# Patient Record
Sex: MALE | Race: OTHER | NOT HISPANIC OR LATINO
[De-identification: names, ages, dates, MRNs, and addresses within clinical notes are randomized per-mention and may not be internally consistent; named-entity substitution may affect disease eponyms.]

---

## 2020-08-22 ENCOUNTER — TRANSCRIPTION ENCOUNTER (OUTPATIENT)
Age: 75
End: 2020-08-22

## 2021-06-30 ENCOUNTER — TRANSCRIPTION ENCOUNTER (OUTPATIENT)
Age: 76
End: 2021-06-30

## 2021-10-11 ENCOUNTER — TRANSCRIPTION ENCOUNTER (OUTPATIENT)
Age: 76
End: 2021-10-11

## 2022-01-02 ENCOUNTER — TRANSCRIPTION ENCOUNTER (OUTPATIENT)
Age: 77
End: 2022-01-02

## 2022-02-27 ENCOUNTER — TRANSCRIPTION ENCOUNTER (OUTPATIENT)
Age: 77
End: 2022-02-27

## 2022-04-21 ENCOUNTER — TRANSCRIPTION ENCOUNTER (OUTPATIENT)
Age: 77
End: 2022-04-21

## 2022-11-29 ENCOUNTER — NON-APPOINTMENT (OUTPATIENT)
Age: 77
End: 2022-11-29

## 2023-10-05 ENCOUNTER — NON-APPOINTMENT (OUTPATIENT)
Age: 78
End: 2023-10-05

## 2023-12-16 ENCOUNTER — NON-APPOINTMENT (OUTPATIENT)
Age: 78
End: 2023-12-16

## 2024-03-08 PROBLEM — Z00.00 ENCOUNTER FOR PREVENTIVE HEALTH EXAMINATION: Status: ACTIVE | Noted: 2024-03-08

## 2024-04-01 ENCOUNTER — APPOINTMENT (OUTPATIENT)
Dept: CARDIOLOGY | Facility: CLINIC | Age: 79
End: 2024-04-01
Payer: MEDICARE

## 2024-04-01 ENCOUNTER — NON-APPOINTMENT (OUTPATIENT)
Age: 79
End: 2024-04-01

## 2024-04-01 VITALS
OXYGEN SATURATION: 98 % | WEIGHT: 199 LBS | SYSTOLIC BLOOD PRESSURE: 134 MMHG | DIASTOLIC BLOOD PRESSURE: 70 MMHG | HEIGHT: 71 IN | HEART RATE: 80 BPM | BODY MASS INDEX: 27.86 KG/M2

## 2024-04-01 DIAGNOSIS — Z82.49 FAMILY HISTORY OF ISCHEMIC HEART DISEASE AND OTHER DISEASES OF THE CIRCULATORY SYSTEM: ICD-10-CM

## 2024-04-01 DIAGNOSIS — K64.9 UNSPECIFIED HEMORRHOIDS: ICD-10-CM

## 2024-04-01 DIAGNOSIS — Z86.718 PERSONAL HISTORY OF OTHER VENOUS THROMBOSIS AND EMBOLISM: ICD-10-CM

## 2024-04-01 DIAGNOSIS — N40.1 BENIGN PROSTATIC HYPERPLASIA WITH LOWER URINARY TRACT SYMPMS: ICD-10-CM

## 2024-04-01 DIAGNOSIS — I45.10 UNSPECIFIED RIGHT BUNDLE-BRANCH BLOCK: ICD-10-CM

## 2024-04-01 DIAGNOSIS — R01.1 CARDIAC MURMUR, UNSPECIFIED: ICD-10-CM

## 2024-04-01 DIAGNOSIS — M54.9 DORSALGIA, UNSPECIFIED: ICD-10-CM

## 2024-04-01 DIAGNOSIS — Z79.01 LONG TERM (CURRENT) USE OF ANTICOAGULANTS: ICD-10-CM

## 2024-04-01 DIAGNOSIS — Z85.51 PERSONAL HISTORY OF MALIGNANT NEOPLASM OF BLADDER: ICD-10-CM

## 2024-04-01 DIAGNOSIS — R94.31 ABNORMAL ELECTROCARDIOGRAM [ECG] [EKG]: ICD-10-CM

## 2024-04-01 DIAGNOSIS — E78.5 HYPERLIPIDEMIA, UNSPECIFIED: ICD-10-CM

## 2024-04-01 DIAGNOSIS — Z78.9 OTHER SPECIFIED HEALTH STATUS: ICD-10-CM

## 2024-04-01 DIAGNOSIS — K21.9 GASTRO-ESOPHAGEAL REFLUX DISEASE W/OUT ESOPHAGITIS: ICD-10-CM

## 2024-04-01 DIAGNOSIS — K22.4 DYSKINESIA OF ESOPHAGUS: ICD-10-CM

## 2024-04-01 PROCEDURE — G2211 COMPLEX E/M VISIT ADD ON: CPT

## 2024-04-01 PROCEDURE — 93000 ELECTROCARDIOGRAM COMPLETE: CPT

## 2024-04-01 PROCEDURE — 99204 OFFICE O/P NEW MOD 45 MIN: CPT

## 2024-04-01 RX ORDER — OMEPRAZOLE 40 MG/1
40 CAPSULE, DELAYED RELEASE ORAL
Refills: 0 | Status: ACTIVE | COMMUNITY
Start: 2024-04-01

## 2024-04-01 RX ORDER — APIXABAN 5 MG/1
5 TABLET, FILM COATED ORAL
Qty: 90 | Refills: 2 | Status: ACTIVE | COMMUNITY
Start: 2024-04-01

## 2024-04-01 RX ORDER — FINASTERIDE 5 MG/1
5 TABLET, FILM COATED ORAL DAILY
Refills: 0 | Status: ACTIVE | COMMUNITY
Start: 2024-04-01

## 2024-04-01 RX ORDER — TAMSULOSIN HYDROCHLORIDE 0.4 MG/1
0.4 CAPSULE ORAL
Refills: 0 | Status: ACTIVE | COMMUNITY
Start: 2024-04-01

## 2024-04-01 RX ORDER — ATORVASTATIN CALCIUM 20 MG/1
20 TABLET, FILM COATED ORAL
Qty: 90 | Refills: 2 | Status: ACTIVE | COMMUNITY
Start: 2024-04-01

## 2024-04-01 NOTE — ASSESSMENT
[FreeTextEntry1] : Reviewed on April 1, 2024. EKG as noted above Most recent labs available reviewed 2020

## 2024-04-01 NOTE — REVIEW OF SYSTEMS
[Joint Pain] : joint pain [Joint Stiffness] : joint stiffness [Dizziness] : no dizziness [Negative] : Heme/Lymph [FreeTextEntry5] : As per HPI

## 2024-04-01 NOTE — DISCUSSION/SUMMARY
[EKG obtained to assist in diagnosis and management of assessed problem(s)] : EKG obtained to assist in diagnosis and management of assessed problem(s) [FreeTextEntry1] : 79 years old M seen today for consultation #1 RBBB abnormal stt changes: fatigue. echo cardiac MPI if needed lexiscan on meds .  Risk benefits alternatives side effects were reviewed.  Understands importance of ruling out any significant high risk obstructive CAD.  Other options would include coronary CTA or left heart catheterization if abnormalities found to be significant. Technetium 99 radioisotope use was reviewed.  Risks, benefits, alternatives of use of radioisotope was discussed at length.  Patient verbalized understanding and agreed with the management plan. 2.  Cardiac murmur.  Right bundle branch block.  Split second heart sound.  Evaluate LV RV and atrial chamber dimensions.  Rule out ASD.  And mitral regurgitation. 3.  Dyslipidemia.  On statin therapy.  Carotid Doppler study with evidence of ASCVD.  Aim LDL cholesterol to less than 70.  Lifestyle modifications reviewed. 4.  Generalized fatigue tiredness.  If no cardiac etiology consider evaluation management for rheumatological disorder.  Also consider for parkinsonian type of syndrome. 5.  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  Watch for bleeding.  High risk medication use.   I have reviewed above at length. I answered all the questions. Patient verbalized understanding Thank you very much for allowing me to participate in your patient's care. Please feel free to call me for any question s. all the above were at length reviewed. Answered all the questions. Thank you very much for this kind referral. Please do not hesitate to give me a call for any question. Part of this transcription was done with voice recognition software and phonetically similar errors are common. I apologize for that. Please do not hesitate to call for any questions due to above.  Sincerely,  Marcial Diggs MD, FACC, RICHA ,

## 2024-04-01 NOTE — PHYSICAL EXAM
[Normal S1, S2] : normal S1, S2 [No Gallop] : no gallop [No Rub] : no rub [Murmur] : murmur [Normal] : clear lung fields, good air entry, no respiratory distress [No Edema] : no edema [Normal Gait] : normal gait [No Cyanosis] : no cyanosis [No Clubbing] : no clubbing [Normal Radial B/L] : normal radial B/L [Normal Speech] : normal speech [Normal DP B/L] : normal DP B/L [Alert and Oriented] : alert and oriented [de-identified] : Decreased carotid upstroke [de-identified] : Midsystolic murmur 2/6 left parasternal region there is a split second heart sound noted. [de-identified] : No bruit [de-identified] : Flat affect

## 2024-04-01 NOTE — REASON FOR VISIT
[Structural Heart and Valve Disease] : structural heart and valve disease [Symptom and Test Evaluation] : symptom and test evaluation [Hyperlipidemia] : hyperlipidemia [Carotid, Aortic and Peripheral Vascular Disease] : carotid, aortic and peripheral vascular disease [FreeTextEntry3] : Dr. Da Silva [FreeTextEntry1] : 79-year-old white gentleman is referred to me for consultation in presence of Cardiac murmur History of DVT on NOAC long-term Dyslipidemia on statin therapy Chronic back pain Increasing fatigue and tiredness recently.  He tries to do activity and exercise at the gym on a regular basis.  He denies any significant chest pain.  There is no PND orthopnea pedal edema at present.  He does wear left lower extremity compression socks. He denies any palpitation dizziness near syncopal or syncopal event. He has no prior history of myocardial infarction, coronary artery disease, cerebrovascular accident, peripheral artery disease, rheumatic fever, thyroid or Lyme disease.

## 2024-05-02 ENCOUNTER — APPOINTMENT (OUTPATIENT)
Dept: CARDIOLOGY | Facility: CLINIC | Age: 79
End: 2024-05-02
Payer: MEDICARE

## 2024-05-02 ENCOUNTER — NON-APPOINTMENT (OUTPATIENT)
Age: 79
End: 2024-05-02

## 2024-05-02 PROCEDURE — 93880 EXTRACRANIAL BILAT STUDY: CPT

## 2024-05-02 PROCEDURE — 93306 TTE W/DOPPLER COMPLETE: CPT

## 2024-05-03 ENCOUNTER — NON-APPOINTMENT (OUTPATIENT)
Age: 79
End: 2024-05-03

## 2024-05-04 ENCOUNTER — NON-APPOINTMENT (OUTPATIENT)
Age: 79
End: 2024-05-04

## 2024-05-07 ENCOUNTER — APPOINTMENT (OUTPATIENT)
Dept: CARDIOLOGY | Facility: CLINIC | Age: 79
End: 2024-05-07
Payer: MEDICARE

## 2024-05-14 ENCOUNTER — APPOINTMENT (OUTPATIENT)
Dept: CARDIOLOGY | Facility: CLINIC | Age: 79
End: 2024-05-14
Payer: MEDICARE

## 2024-05-14 PROCEDURE — A9502: CPT

## 2024-05-14 PROCEDURE — 93015 CV STRESS TEST SUPVJ I&R: CPT

## 2024-05-14 PROCEDURE — 78452 HT MUSCLE IMAGE SPECT MULT: CPT

## 2024-05-20 ENCOUNTER — APPOINTMENT (OUTPATIENT)
Dept: CARDIOLOGY | Facility: CLINIC | Age: 79
End: 2024-05-20
Payer: MEDICARE

## 2024-05-20 VITALS
OXYGEN SATURATION: 97 % | BODY MASS INDEX: 27.2 KG/M2 | DIASTOLIC BLOOD PRESSURE: 62 MMHG | WEIGHT: 195 LBS | HEART RATE: 75 BPM | SYSTOLIC BLOOD PRESSURE: 148 MMHG

## 2024-05-20 DIAGNOSIS — I27.20 PULMONARY HYPERTENSION, UNSPECIFIED: ICD-10-CM

## 2024-05-20 PROCEDURE — 99214 OFFICE O/P EST MOD 30 MIN: CPT

## 2024-05-20 RX ORDER — LOSARTAN POTASSIUM 25 MG/1
25 TABLET, FILM COATED ORAL DAILY
Qty: 90 | Refills: 1 | Status: ACTIVE | COMMUNITY
Start: 2024-05-20 | End: 1900-01-01

## 2024-05-20 NOTE — HISTORY OF PRESENT ILLNESS
[FreeTextEntry1] : FLAVIA GONZALEZ  is a 79 year M  who presents today May 20, 2024 in clinical follow-up and to review testing.  Initially seen in consultation in presence of cardiac murmur, history of DVT/PE on NOAC long-term, dyslipidemia on statin therapy.  Reported increasing fatigue and tiredness recently.  He tries to do activity and exercise at the gym on a regular basis.   Echo, carotid US and nuclear stress test completed.   Today he denies chest pain, pressure, unusual shortness of breath, lightheadedness, dizziness, near syncope or syncope.   There is no prior history of myocardial infarction, coronary revascularization, history of ischemic heart disease, or symptomatic congestive heart failure. There is no history of symptomatic arrhythmias including atrial fibrillation.  Denies history of tobacco use Denies history of snoring Occasional daytime somnolence

## 2024-05-20 NOTE — PHYSICAL EXAM
[Normal S1, S2] : normal S1, S2 [No Rub] : no rub [No Gallop] : no gallop [Murmur] : murmur [Normal] : clear lung fields, good air entry, no respiratory distress [Normal Gait] : normal gait [No Edema] : no edema [No Cyanosis] : no cyanosis [No Clubbing] : no clubbing [Normal Radial B/L] : normal radial B/L [Normal DP B/L] : normal DP B/L [Normal Speech] : normal speech [Alert and Oriented] : alert and oriented [de-identified] : Decreased carotid upstroke [de-identified] : Midsystolic murmur 2/6 left parasternal region there is a split second heart sound noted. [de-identified] : No bruit [de-identified] : Flat affect

## 2024-05-20 NOTE — CARDIOLOGY SUMMARY
[de-identified] : April 1, 2024.  Normal sinus rhythm right bundle branch block [de-identified] : Nuclear stress test 5/7/2024 no evidence of ischemia, attenuation artifact [de-identified] : Echo 5/2/2024 - EF 60%, mild LVH, mild-mod AR, mod TR, mod pulmonary HTN [de-identified] : Carotid US 5/2024 mild non-obstructive disease

## 2024-05-20 NOTE — REVIEW OF SYSTEMS
[Joint Pain] : joint pain [Joint Stiffness] : joint stiffness [Negative] : Heme/Lymph [Dizziness] : no dizziness [FreeTextEntry5] : As per HPI

## 2024-05-20 NOTE — REASON FOR VISIT
[Symptom and Test Evaluation] : symptom and test evaluation [Structural Heart and Valve Disease] : structural heart and valve disease [Hyperlipidemia] : hyperlipidemia [Carotid, Aortic and Peripheral Vascular Disease] : carotid, aortic and peripheral vascular disease [FreeTextEntry3] : Dr. Da Silva

## 2024-05-20 NOTE — DISCUSSION/SUMMARY
[FreeTextEntry1] : FLAVIA GONZALEZ  is a 79 year M  who presents today May 20, 2024 with the above history and the following active issues.   RBBB abnormal Nuclear stress test with normal perfusion  Cardiac murmur.  Right bundle branch block.  Split second heart sound.   Mild-mod AR, DE, TR and mild MR  Dyslipidemia.  On statin therapy.  Carotid Doppler study with mild-mod atherosclerosis.  Aim LDL cholesterol to less than 70.  Lifestyle modifications reviewed.  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  Watch for bleeding.  High risk medication use.  Pulmonary HTN Recommend PFT's and sleep study  HTN Recommend starting Losartan 25mg QD Follow-up labs in 2 weeks Low sodium diet Lifestyle and risk factor modification  Red flag symptoms which would warrant sooner emergent evaluation reviewed with the patient.  Questions and concerns were addressed and answered.  Limitations of non-invasive testing reviewed  Sincerely,  Audelia Min PA-C Patients history, testing and plan reviewed with supervising MD: Dr. Marcial Diggs

## 2024-08-23 ENCOUNTER — NON-APPOINTMENT (OUTPATIENT)
Age: 79
End: 2024-08-23

## 2024-09-13 ENCOUNTER — NON-APPOINTMENT (OUTPATIENT)
Age: 79
End: 2024-09-13

## 2024-09-30 ENCOUNTER — RX RENEWAL (OUTPATIENT)
Age: 79
End: 2024-09-30

## 2025-01-14 ENCOUNTER — NON-APPOINTMENT (OUTPATIENT)
Age: 80
End: 2025-01-14

## 2025-07-01 ENCOUNTER — OFFICE (OUTPATIENT)
Dept: URBAN - METROPOLITAN AREA CLINIC 97 | Facility: CLINIC | Age: 80
Setting detail: OPHTHALMOLOGY
End: 2025-07-01
Payer: MEDICARE

## 2025-07-01 DIAGNOSIS — H01.001: ICD-10-CM

## 2025-07-01 DIAGNOSIS — H43.812: ICD-10-CM

## 2025-07-01 DIAGNOSIS — H25.13: ICD-10-CM

## 2025-07-01 DIAGNOSIS — H01.004: ICD-10-CM

## 2025-07-01 DIAGNOSIS — H52.4: ICD-10-CM

## 2025-07-01 PROCEDURE — 92015 DETERMINE REFRACTIVE STATE: CPT | Performed by: OPTOMETRIST

## 2025-07-01 PROCEDURE — 92004 COMPRE OPH EXAM NEW PT 1/>: CPT | Performed by: OPTOMETRIST

## 2025-07-01 ASSESSMENT — REFRACTION_MANIFEST
OS_AXIS: 175
OD_CYLINDER: +1.50
OS_SPHERE: -0.50
OS_CYLINDER: +2.50
OS_VA1: 20/25
OD_AXIS: 180
OD_CYLINDER: +1.75
OD_SPHERE: PLANO
OS_SPHERE: -0.75
OS_ADD: +2.25
OD_ADD: +2.25
OS_VA1: 20/25
OD_VA1: 20/25
OD_AXIS: 180
OS_CYLINDER: +2.00
OD_VA1: 20/25
OD_SPHERE: PLANO
OS_AXIS: 175

## 2025-07-01 ASSESSMENT — REFRACTION_AUTOREFRACTION
OD_AXIS: 010
OS_CYLINDER: +2.75
OD_SPHERE: -0.25
OS_SPHERE: -0.75
OS_AXIS: 175
OD_CYLINDER: +2.00

## 2025-07-01 ASSESSMENT — KERATOMETRY
OS_AXISANGLE_DEGREES: 177
OD_K2POWER_DIOPTERS: 44.00
OS_K2POWER_DIOPTERS: 44.50
OS_K1POWER_DIOPTERS: 43.00
METHOD_AUTO_MANUAL: AUTO
OD_AXISANGLE_DEGREES: 002
OD_K1POWER_DIOPTERS: 42.50

## 2025-07-01 ASSESSMENT — LID EXAM ASSESSMENTS
OD_COMMENTS: 1+ COLLARETTES
OD_BLEPHARITIS: RUL 1+
OS_COMMENTS: 1+ COLLARETTES
OS_BLEPHARITIS: LUL 1+

## 2025-07-01 ASSESSMENT — REFRACTION_CURRENTRX
OS_ADD: +2.50
OS_VPRISM_DIRECTION: SV
OD_OVR_VA: 20/
OD_VPRISM_DIRECTION: SV
OD_ADD: +2.50
OS_OVR_VA: 20/

## 2025-07-01 ASSESSMENT — VISUAL ACUITY
OD_BCVA: 20/50
OS_BCVA: 20/50

## 2025-07-01 ASSESSMENT — CONFRONTATIONAL VISUAL FIELD TEST (CVF)
OD_FINDINGS: FULL
OS_FINDINGS: FULL